# Patient Record
Sex: MALE | Race: WHITE
[De-identification: names, ages, dates, MRNs, and addresses within clinical notes are randomized per-mention and may not be internally consistent; named-entity substitution may affect disease eponyms.]

---

## 2020-01-26 ENCOUNTER — HOSPITAL ENCOUNTER (EMERGENCY)
Dept: HOSPITAL 50 - VM.ED | Age: 35
Discharge: HOME | End: 2020-01-26
Payer: COMMERCIAL

## 2020-01-26 DIAGNOSIS — Z91.030: Primary | ICD-10-CM

## 2020-01-26 DIAGNOSIS — Z88.8: ICD-10-CM

## 2020-01-26 DIAGNOSIS — Z79.82: ICD-10-CM

## 2020-01-26 DIAGNOSIS — I10: ICD-10-CM

## 2020-01-26 DIAGNOSIS — Z79.899: ICD-10-CM

## 2020-01-26 NOTE — EDM.PDOC
ED HPI GENERAL MEDICAL PROBLEM





- General


Chief Complaint: Laceration


Stated Complaint: INJURY TO THE HEAD


Time Seen by Provider: 01/26/20 11:15


Source of Information: Reports: Patient


History Limitations: Reports: No Limitations





- History of Present Illness


INITIAL COMMENTS - FREE TEXT/NARRATIVE: 





Patient presents to ER with a large laceration to the back of his head.  Was 

wrestling with his cousin and fell and hit his head on the tv stand.  He denies 

loss of consciousness.  Was drinking "a fair amount of alcohol".  Went to bed 

around 7 am today and awoke and evaluated the head wound more closely and 

realized he needed evaluation.  Does have a headache today.  Questions if 

related to lack of sleep, alcohol or the head trauma.  Denies any other 

neurological deficits, ie. no vision changes, no weakness, numbness or tingling 

in his extremities.  No nausea or vomiting.


Onset: Today, Sudden


Location: Reports: Head


Quality: Reports: Ache


Severity: Mild


Associated Symptoms: Reports: Headaches.  Denies: Nausea/Vomiting


  ** Head


Pain Score (Numeric/FACES): 5





- Related Data


 Allergies











Allergy/AdvReac Type Severity Reaction Status Date / Time


 


bee pollen Allergy  Other Verified 01/26/20 11:18


 


brompheniramine Allergy  Other Verified 01/26/20 11:18





[From Dimetapp DM Cold-Cough     





(PE)]     


 


dextromethorphan Allergy  Other Verified 01/26/20 11:18





[From Dimetapp DM Cold-Cough     





(PE)]     


 


phenylephrine Allergy  Other Verified 01/26/20 11:18





[From Dimetapp DM Cold-Cough     





(PE)]     











Home Meds: 


 Home Meds





Aspirin 81 mg PO DAILY 01/26/20 [History]


lisinopriL [Lisinopril] 20 mg PO DAILY 01/26/20 [History]











Past Medical History


Cardiovascular History: Reports: Hypertension





Social & Family History





- Family History


Family Medical History: Noncontributory





- Tobacco Use


Smoking Status *Q: Never Smoker





- Alcohol Use


Days Per Week of Alcohol Use: 2


Number of Drinks Per Day: 4


Total Drinks Per Week: 8





- Recreational Drug Use


Recreational Drug Use: No





ED ROS GENERAL





- Review of Systems


Review Of Systems: Comprehensive ROS is negative, except as noted in HPI.





ED EXAM, SKIN/RASH


Exam: See Below


Exam Limited By: No Limitations


General Appearance: Alert, WD/WN, No Apparent Distress


Eye Exam: Bilateral Eye: EOMI, PERRL


Ears: Normal External Exam, Normal TMs


Nose: Normal Inspection, Normal Mucosa, No Blood


Throat/Mouth: Normal Inspection, Normal Oropharynx


Head: Normocephalic


Neck: Normal Inspection, Supple, Non-Tender


Respiratory/Chest: No Respiratory Distress, Lungs Clear, Normal Breath Sounds


Cardiovascular: Regular Rate, Rhythm


GI/Abdominal: Normal Bowel Sounds, Soft, Non-Tender


Neurological: Alert, Oriented, CN II-XII Intact, Normal Cognition, Normal Gait, 

Normal Reflexes, No Motor/Sensory Deficits


Skin: Warm, Dry, Wound/Incision (5.3 cm laceration to posterior scalp)





ED SKIN PROCEDURES





- Laceration/Wound Repair


  ** Head


Appearance: Superficial, Irregular, Mildly Contaminated


Anesthetic Type: Local


Local Anesthesia - Lidocaine (Xylocaine): 1% with EPI


Local Anesthetic Volume: 5cc


Skin Prep: Saline


Exploration/Debridement/Repair: Wound Explored, Explored to Base


Closed with: Staples


Lac/Wound length In cm: 5.3


Sterile Dressing Applied: Nurse


Tetanus Status Addressed: Yes


Complications: No





Course





- Vital Signs


Last Recorded V/S: 





 Last Vital Signs











Temp  98.4 F   01/26/20 11:21


 


Pulse  96   01/26/20 11:21


 


Resp  20   01/26/20 11:21


 


BP  154/88 H  01/26/20 11:21


 


Pulse Ox  97   01/26/20 11:21














- Orders/Labs/Meds


Orders: 





 Active Orders 24 hr











 Category Date Time Status


 


 Bacitracin [Bacitracin Oint] Med  01/26/20 11:34 Once





 See Dose Instructions  TOP ONETIME ONE   








 Medication Orders





Bacitracin (Bacitracin Oint)  0 gm TOP ONETIME ONE


   Stop: 01/26/20 11:35








Meds: 





Medications











Generic Name Dose Route Start Last Admin





  Trade Name Freq  PRN Reason Stop Dose Admin


 


Bacitracin  0 gm  01/26/20 11:34  





  Bacitracin Oint  TOP  01/26/20 11:35  





  ONETIME ONE   





     





     





     





     














Discontinued Medications














Generic Name Dose Route Start Last Admin





  Trade Name Freq  PRN Reason Stop Dose Admin


 


Lidocaine/Epinephrine  20 ml  01/26/20 11:22  





  Xylocaine 1% With Epinephrine 1:100,000  INFILT  01/26/20 11:23  





  ONETIME ONE   





     





     





     





     














Departure





- Departure


Time of Disposition: 11:45


Disposition: Home, Self-Care 01


Condition: Good


Clinical Impression: 


 Broken skin, Laceration of head








- Discharge Information


*PRESCRIPTION DRUG MONITORING PROGRAM REVIEWED*: No


*COPY OF PRESCRIPTION DRUG MONITORING REPORT IN PATIENT LUIS DANIEL: No


Instructions:  Laceration Care, Adult, Stitches, Staples, or Adhesive Wound 

Closure


Referrals: 


Sangeeta Love PA-C [Primary Care Provider] - 


Additional Instructions: 


1.  Keep wound clean and dry


2.  Tylenol for headache


3.  Monitor for any neurological changes, return if concerns


4.  Staples out in 5 days


5.  See primary care provider if ongoing concerns.





Sepsis Event Note





- Evaluation


Sepsis Screening Result: No Definite Risk





- Focused Exam


Vital Signs: 





 Vital Signs











  Temp Pulse Resp BP Pulse Ox


 


 01/26/20 11:21  98.4 F  96  20  154/88 H  97











Date Exam was Performed: 01/26/20


Time Exam was Performed: 11:37





- My Orders


Last 24 Hours: 





My Active Orders





01/26/20 11:34


Bacitracin [Bacitracin Oint]   See Dose Instructions  TOP ONETIME ONE 














- Assessment/Plan


Last 24 Hours: 





My Active Orders





01/26/20 11:34


Bacitracin [Bacitracin Oint]   See Dose Instructions  TOP ONETIME ONE

## 2022-06-17 ENCOUNTER — HOSPITAL ENCOUNTER (EMERGENCY)
Dept: HOSPITAL 52 - LL.ED | Age: 37
Discharge: HOME | End: 2022-06-17
Payer: COMMERCIAL
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
## 2022-06-17 DIAGNOSIS — Z88.8: ICD-10-CM

## 2022-06-17 DIAGNOSIS — Z79.899: ICD-10-CM

## 2022-06-17 DIAGNOSIS — Z79.82: ICD-10-CM

## 2022-06-17 DIAGNOSIS — Z91.030: ICD-10-CM

## 2022-06-17 DIAGNOSIS — I10: ICD-10-CM
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
## 2022-06-17 DIAGNOSIS — S51.831A: Primary | ICD-10-CM

## 2022-08-19 ENCOUNTER — HOSPITAL ENCOUNTER (EMERGENCY)
Dept: HOSPITAL 52 - LL.ED | Age: 37
Discharge: HOME | End: 2022-08-19
Payer: COMMERCIAL

## 2022-08-19 DIAGNOSIS — Z79.899: ICD-10-CM

## 2022-08-19 DIAGNOSIS — Z88.8: ICD-10-CM

## 2022-08-19 DIAGNOSIS — I10: ICD-10-CM

## 2022-08-19 DIAGNOSIS — T15.02XA: Primary | ICD-10-CM

## 2022-08-19 DIAGNOSIS — Z79.82: ICD-10-CM

## 2022-08-19 DIAGNOSIS — Z91.030: ICD-10-CM
